# Patient Record
Sex: FEMALE | Race: WHITE | NOT HISPANIC OR LATINO | Employment: OTHER | URBAN - METROPOLITAN AREA
[De-identification: names, ages, dates, MRNs, and addresses within clinical notes are randomized per-mention and may not be internally consistent; named-entity substitution may affect disease eponyms.]

---

## 2023-11-02 PROBLEM — E78.2 MIXED HYPERLIPIDEMIA: Status: ACTIVE | Noted: 2023-11-02

## 2023-11-02 PROBLEM — E04.2 MULTIPLE THYROID NODULES: Status: ACTIVE | Noted: 2023-11-02

## 2023-11-02 PROBLEM — R35.0 URINARY FREQUENCY: Status: ACTIVE | Noted: 2023-11-02

## 2023-11-02 PROBLEM — R31.29 OTHER MICROSCOPIC HEMATURIA: Status: ACTIVE | Noted: 2023-11-02

## 2023-11-02 PROBLEM — D12.6 TUBULAR ADENOMA OF COLON: Status: ACTIVE | Noted: 2023-11-02

## 2023-11-02 PROBLEM — E55.9 VITAMIN D DEFICIENCY: Status: ACTIVE | Noted: 2023-11-02

## 2023-11-02 PROBLEM — E03.8 SUBCLINICAL HYPOTHYROIDISM: Status: ACTIVE | Noted: 2023-11-02

## 2023-11-02 PROBLEM — M85.80 OSTEOPENIA: Status: ACTIVE | Noted: 2023-11-02

## 2023-11-02 RX ORDER — ACETAMINOPHEN 500 MG
1 TABLET ORAL DAILY
COMMUNITY
Start: 2014-08-11

## 2023-11-06 ENCOUNTER — OFFICE VISIT (OUTPATIENT)
Dept: PRIMARY CARE | Facility: CLINIC | Age: 78
End: 2023-11-06
Payer: MEDICARE

## 2023-11-06 VITALS
BODY MASS INDEX: 25.1 KG/M2 | HEART RATE: 71 BPM | HEIGHT: 64 IN | OXYGEN SATURATION: 97 % | DIASTOLIC BLOOD PRESSURE: 74 MMHG | SYSTOLIC BLOOD PRESSURE: 130 MMHG | WEIGHT: 147 LBS

## 2023-11-06 DIAGNOSIS — E03.8 SUBCLINICAL HYPOTHYROIDISM: ICD-10-CM

## 2023-11-06 DIAGNOSIS — M81.0 AGE RELATED OSTEOPOROSIS, UNSPECIFIED PATHOLOGICAL FRACTURE PRESENCE: ICD-10-CM

## 2023-11-06 DIAGNOSIS — R35.0 URINARY FREQUENCY: ICD-10-CM

## 2023-11-06 DIAGNOSIS — Z28.21 IMMUNIZATION DECLINED: ICD-10-CM

## 2023-11-06 DIAGNOSIS — E78.2 MIXED HYPERLIPIDEMIA: ICD-10-CM

## 2023-11-06 DIAGNOSIS — E04.2 MULTIPLE THYROID NODULES: ICD-10-CM

## 2023-11-06 DIAGNOSIS — Z12.31 ENCOUNTER FOR SCREENING MAMMOGRAM FOR BREAST CANCER: ICD-10-CM

## 2023-11-06 DIAGNOSIS — Z00.00 ROUTINE GENERAL MEDICAL EXAMINATION AT HEALTH CARE FACILITY: Primary | ICD-10-CM

## 2023-11-06 PROCEDURE — 1159F MED LIST DOCD IN RCRD: CPT | Performed by: INTERNAL MEDICINE

## 2023-11-06 PROCEDURE — 99214 OFFICE O/P EST MOD 30 MIN: CPT | Performed by: INTERNAL MEDICINE

## 2023-11-06 PROCEDURE — 1036F TOBACCO NON-USER: CPT | Performed by: INTERNAL MEDICINE

## 2023-11-06 PROCEDURE — 1170F FXNL STATUS ASSESSED: CPT | Performed by: INTERNAL MEDICINE

## 2023-11-06 PROCEDURE — 1160F RVW MEDS BY RX/DR IN RCRD: CPT | Performed by: INTERNAL MEDICINE

## 2023-11-06 PROCEDURE — G0439 PPPS, SUBSEQ VISIT: HCPCS | Performed by: INTERNAL MEDICINE

## 2023-11-06 ASSESSMENT — ENCOUNTER SYMPTOMS
PALPITATIONS: 0
ABDOMINAL PAIN: 0
FATIGUE: 0
DIZZINESS: 0
NERVOUS/ANXIOUS: 0
SHORTNESS OF BREATH: 0
DEPRESSED MOOD: 0
TREMORS: 0
MYALGIAS: 0

## 2023-11-06 ASSESSMENT — PATIENT HEALTH QUESTIONNAIRE - PHQ9
2. FEELING DOWN, DEPRESSED OR HOPELESS: NOT AT ALL
1. LITTLE INTEREST OR PLEASURE IN DOING THINGS: NOT AT ALL
SUM OF ALL RESPONSES TO PHQ9 QUESTIONS 1 AND 2: 0

## 2023-11-06 ASSESSMENT — ACTIVITIES OF DAILY LIVING (ADL)
MANAGING_FINANCES: INDEPENDENT
DOING_HOUSEWORK: INDEPENDENT
GROCERY_SHOPPING: INDEPENDENT
BATHING: INDEPENDENT
TAKING_MEDICATION: INDEPENDENT
DRESSING: INDEPENDENT

## 2023-11-06 NOTE — PROGRESS NOTES
"Subjective   Reason for Visit: Carmen Luo is an 77 y.o. female here for a Medicare Wellness visit and chronic medical problems.    Past Medical, Surgical, and Family History reviewed and updated in chart.    Reviewed all medications by prescribing practitioner or clinical pharmacist (such as prescriptions, OTCs, herbal therapies and supplements) and documented in the medical record.    Doing well.  2 meals daily.  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  No change in weight.  Last BD 2021, osteoporosis.  Interested in rheum consult.  Otc and labs reviewed.      Hyperlipidemia  This is a chronic problem. The current episode started more than 1 year ago. The problem is controlled. Recent lipid tests were reviewed and are high. There are no known factors aggravating her hyperlipidemia. Pertinent negatives include no chest pain, myalgias or shortness of breath. She is currently on no antihyperlipidemic treatment. The current treatment provides no improvement of lipids. There are no compliance problems.    Thyroid Problem  Presents for follow-up visit. Patient reports no anxiety, depressed mood, fatigue, palpitations or tremors. The symptoms have been stable. Her past medical history is significant for hyperlipidemia.       Patient Care Team:  Josh Godoy MD as PCP - General  Josh Godoy MD [facility] P[facility]     Review of Systems   Constitutional:  Negative for fatigue.   Respiratory:  Negative for shortness of breath.    Cardiovascular:  Negative for chest pain and palpitations.   Gastrointestinal:  Negative for abdominal pain.   Musculoskeletal:  Negative for myalgias.   Neurological:  Negative for dizziness and tremors.   Psychiatric/Behavioral:  The patient is not nervous/anxious.        Objective   Vitals:  /74 (BP Location: Right arm, Patient Position: Sitting)   Pulse 71   Ht 1.613 m (5' 3.5\")   Wt 66.7 kg (147 lb)   SpO2 97%   BMI 25.63 kg/m²       Physical " Exam  Constitutional:       Appearance: Normal appearance.   Neck:      Vascular: No carotid bruit.   Cardiovascular:      Rate and Rhythm: Normal rate and regular rhythm.      Pulses: Normal pulses.      Heart sounds: Normal heart sounds.   Pulmonary:      Effort: Pulmonary effort is normal.      Breath sounds: Normal breath sounds.   Abdominal:      General: Abdomen is flat. Bowel sounds are normal.      Palpations: Abdomen is soft.   Neurological:      General: No focal deficit present.      Mental Status: She is alert.   Psychiatric:         Mood and Affect: Mood normal.         Behavior: Behavior normal.         Thought Content: Thought content normal.         Judgment: Judgment normal.       Assessment/Plan   Problem List Items Addressed This Visit       Mixed hyperlipidemia    Current Assessment & Plan     Lipids, LDL not at goal without statin.  Recommend low fat/cholesterol diet.  Pt declined statin.           Relevant Orders    CBC and Auto Differential    Comprehensive metabolic panel    Lipid panel    Multiple thyroid nodules    Current Assessment & Plan     US, radha thyroid nodules, 2022.         Relevant Orders    CBC and Auto Differential    Comprehensive metabolic panel    TSH with reflex to Free T4 if abnormal    Subclinical hypothyroidism    Current Assessment & Plan     Thyroid, free T4 and TSH radha without levothyroxine.         Relevant Orders    TSH with reflex to Free T4 if abnormal    Urinary frequency    Relevant Orders    Urinalysis with Reflex Microscopic    Routine general medical examination at health care facility - Primary    Relevant Orders    1 Year Follow Up In Primary Care - Wellness Exam    Immunization declined    Age related osteoporosis    Current Assessment & Plan     Rec rheumatology consult.         Relevant Orders    Referral to Rheumatology    CBC and Auto Differential    Comprehensive metabolic panel    TSH with reflex to Free T4 if abnormal    Vitamin D  25-Hydroxy,Total (for eval of Vitamin D levels)

## 2023-11-06 NOTE — ASSESSMENT & PLAN NOTE
Lipids, LDL not at goal without statin.  Recommend low fat/cholesterol diet.  Pt declined statin.

## 2023-11-09 ENCOUNTER — LAB (OUTPATIENT)
Dept: LAB | Facility: LAB | Age: 78
End: 2023-11-09
Payer: MEDICARE

## 2023-11-09 ENCOUNTER — ANCILLARY PROCEDURE (OUTPATIENT)
Dept: RADIOLOGY | Facility: CLINIC | Age: 78
End: 2023-11-09
Payer: MEDICARE

## 2023-11-09 DIAGNOSIS — M81.0 AGE RELATED OSTEOPOROSIS, UNSPECIFIED PATHOLOGICAL FRACTURE PRESENCE: ICD-10-CM

## 2023-11-09 DIAGNOSIS — R35.0 URINARY FREQUENCY: ICD-10-CM

## 2023-11-09 DIAGNOSIS — E78.2 MIXED HYPERLIPIDEMIA: ICD-10-CM

## 2023-11-09 DIAGNOSIS — Z12.31 ENCOUNTER FOR SCREENING MAMMOGRAM FOR BREAST CANCER: ICD-10-CM

## 2023-11-09 DIAGNOSIS — E03.8 SUBCLINICAL HYPOTHYROIDISM: ICD-10-CM

## 2023-11-09 DIAGNOSIS — E04.2 MULTIPLE THYROID NODULES: ICD-10-CM

## 2023-11-09 PROCEDURE — 84443 ASSAY THYROID STIM HORMONE: CPT

## 2023-11-09 PROCEDURE — 82306 VITAMIN D 25 HYDROXY: CPT

## 2023-11-09 PROCEDURE — 85025 COMPLETE CBC W/AUTO DIFF WBC: CPT

## 2023-11-09 PROCEDURE — 80053 COMPREHEN METABOLIC PANEL: CPT

## 2023-11-09 PROCEDURE — 77063 BREAST TOMOSYNTHESIS BI: CPT | Performed by: STUDENT IN AN ORGANIZED HEALTH CARE EDUCATION/TRAINING PROGRAM

## 2023-11-09 PROCEDURE — 36415 COLL VENOUS BLD VENIPUNCTURE: CPT

## 2023-11-09 PROCEDURE — 80061 LIPID PANEL: CPT

## 2023-11-09 PROCEDURE — 81001 URINALYSIS AUTO W/SCOPE: CPT

## 2023-11-09 PROCEDURE — 77067 SCR MAMMO BI INCL CAD: CPT | Performed by: STUDENT IN AN ORGANIZED HEALTH CARE EDUCATION/TRAINING PROGRAM

## 2023-11-09 PROCEDURE — 77067 SCR MAMMO BI INCL CAD: CPT

## 2023-11-10 DIAGNOSIS — E87.1 HYPONATREMIA: Primary | ICD-10-CM

## 2023-11-10 LAB
25(OH)D3 SERPL-MCNC: 65 NG/ML (ref 30–100)
ALBUMIN SERPL BCP-MCNC: 4.5 G/DL (ref 3.4–5)
ALP SERPL-CCNC: 96 U/L (ref 33–136)
ALT SERPL W P-5'-P-CCNC: 12 U/L (ref 7–45)
ANION GAP SERPL CALC-SCNC: 13 MMOL/L (ref 10–20)
APPEARANCE UR: CLEAR
AST SERPL W P-5'-P-CCNC: 19 U/L (ref 9–39)
BASOPHILS # BLD AUTO: 0.03 X10*3/UL (ref 0–0.1)
BASOPHILS NFR BLD AUTO: 0.4 %
BILIRUB SERPL-MCNC: 0.8 MG/DL (ref 0–1.2)
BILIRUB UR STRIP.AUTO-MCNC: NEGATIVE MG/DL
BUN SERPL-MCNC: 17 MG/DL (ref 6–23)
CALCIUM SERPL-MCNC: 9.8 MG/DL (ref 8.6–10.6)
CHLORIDE SERPL-SCNC: 95 MMOL/L (ref 98–107)
CHOLEST SERPL-MCNC: 236 MG/DL (ref 0–199)
CHOLESTEROL/HDL RATIO: 4.8
CO2 SERPL-SCNC: 25 MMOL/L (ref 21–32)
COLOR UR: COLORLESS
CREAT SERPL-MCNC: 0.76 MG/DL (ref 0.5–1.05)
EOSINOPHIL # BLD AUTO: 0.1 X10*3/UL (ref 0–0.4)
EOSINOPHIL NFR BLD AUTO: 1.3 %
ERYTHROCYTE [DISTWIDTH] IN BLOOD BY AUTOMATED COUNT: 12.6 % (ref 11.5–14.5)
GFR SERPL CREATININE-BSD FRML MDRD: 80 ML/MIN/1.73M*2
GLUCOSE SERPL-MCNC: 94 MG/DL (ref 74–99)
GLUCOSE UR STRIP.AUTO-MCNC: NEGATIVE MG/DL
HCT VFR BLD AUTO: 39.1 % (ref 36–46)
HDLC SERPL-MCNC: 49 MG/DL
HGB BLD-MCNC: 13 G/DL (ref 12–16)
IMM GRANULOCYTES # BLD AUTO: 0.02 X10*3/UL (ref 0–0.5)
IMM GRANULOCYTES NFR BLD AUTO: 0.3 % (ref 0–0.9)
KETONES UR STRIP.AUTO-MCNC: NEGATIVE MG/DL
LDLC SERPL CALC-MCNC: 169 MG/DL
LEUKOCYTE ESTERASE UR QL STRIP.AUTO: NEGATIVE
LYMPHOCYTES # BLD AUTO: 1.84 X10*3/UL (ref 0.8–3)
LYMPHOCYTES NFR BLD AUTO: 23.1 %
MCH RBC QN AUTO: 29.5 PG (ref 26–34)
MCHC RBC AUTO-ENTMCNC: 33.2 G/DL (ref 32–36)
MCV RBC AUTO: 89 FL (ref 80–100)
MONOCYTES # BLD AUTO: 0.49 X10*3/UL (ref 0.05–0.8)
MONOCYTES NFR BLD AUTO: 6.2 %
NEUTROPHILS # BLD AUTO: 5.48 X10*3/UL (ref 1.6–5.5)
NEUTROPHILS NFR BLD AUTO: 68.7 %
NITRITE UR QL STRIP.AUTO: NEGATIVE
NON HDL CHOLESTEROL: 187 MG/DL (ref 0–149)
NRBC BLD-RTO: 0 /100 WBCS (ref 0–0)
PH UR STRIP.AUTO: 7 [PH]
PLATELET # BLD AUTO: 229 X10*3/UL (ref 150–450)
POTASSIUM SERPL-SCNC: 4 MMOL/L (ref 3.5–5.3)
PROT SERPL-MCNC: 6.7 G/DL (ref 6.4–8.2)
PROT UR STRIP.AUTO-MCNC: NEGATIVE MG/DL
RBC # BLD AUTO: 4.41 X10*6/UL (ref 4–5.2)
RBC # UR STRIP.AUTO: ABNORMAL /UL
RBC #/AREA URNS AUTO: NORMAL /HPF
SODIUM SERPL-SCNC: 129 MMOL/L (ref 136–145)
SP GR UR STRIP.AUTO: 1
TRIGL SERPL-MCNC: 92 MG/DL (ref 0–149)
TSH SERPL-ACNC: 3.21 MIU/L (ref 0.44–3.98)
UROBILINOGEN UR STRIP.AUTO-MCNC: <2 MG/DL
VLDL: 18 MG/DL (ref 0–40)
WBC # BLD AUTO: 8 X10*3/UL (ref 4.4–11.3)
WBC #/AREA URNS AUTO: NORMAL /HPF

## 2023-11-20 ENCOUNTER — LAB (OUTPATIENT)
Dept: LAB | Facility: LAB | Age: 78
End: 2023-11-20
Payer: MEDICARE

## 2023-11-20 DIAGNOSIS — E87.1 HYPONATREMIA: ICD-10-CM

## 2023-11-20 LAB
ANION GAP SERPL CALC-SCNC: 10 MMOL/L (ref 10–20)
BUN SERPL-MCNC: 15 MG/DL (ref 6–23)
CALCIUM SERPL-MCNC: 10.1 MG/DL (ref 8.6–10.6)
CHLORIDE SERPL-SCNC: 102 MMOL/L (ref 98–107)
CO2 SERPL-SCNC: 29 MMOL/L (ref 21–32)
CREAT SERPL-MCNC: 0.9 MG/DL (ref 0.5–1.05)
GFR SERPL CREATININE-BSD FRML MDRD: 66 ML/MIN/1.73M*2
GLUCOSE SERPL-MCNC: 106 MG/DL (ref 74–99)
POTASSIUM SERPL-SCNC: 4.1 MMOL/L (ref 3.5–5.3)
SODIUM SERPL-SCNC: 137 MMOL/L (ref 136–145)

## 2023-11-20 PROCEDURE — 80048 BASIC METABOLIC PNL TOTAL CA: CPT

## 2023-11-20 PROCEDURE — 36415 COLL VENOUS BLD VENIPUNCTURE: CPT

## 2024-10-16 ENCOUNTER — TELEPHONE (OUTPATIENT)
Dept: PRIMARY CARE | Facility: CLINIC | Age: 79
End: 2024-10-16
Payer: MEDICARE

## 2024-10-21 DIAGNOSIS — M81.0 AGE-RELATED OSTEOPOROSIS WITHOUT CURRENT PATHOLOGICAL FRACTURE: ICD-10-CM

## 2024-10-21 DIAGNOSIS — E55.9 VITAMIN D DEFICIENCY: ICD-10-CM

## 2024-10-21 DIAGNOSIS — R31.29 OTHER MICROSCOPIC HEMATURIA: ICD-10-CM

## 2024-10-21 DIAGNOSIS — E78.2 MIXED HYPERLIPIDEMIA: Primary | ICD-10-CM

## 2024-10-21 DIAGNOSIS — E03.8 SUBCLINICAL HYPOTHYROIDISM: ICD-10-CM

## 2024-10-25 ENCOUNTER — LAB (OUTPATIENT)
Dept: LAB | Facility: LAB | Age: 79
End: 2024-10-25
Payer: MEDICARE

## 2024-10-25 DIAGNOSIS — E03.8 SUBCLINICAL HYPOTHYROIDISM: ICD-10-CM

## 2024-10-25 DIAGNOSIS — E78.2 MIXED HYPERLIPIDEMIA: ICD-10-CM

## 2024-10-25 DIAGNOSIS — R31.29 OTHER MICROSCOPIC HEMATURIA: ICD-10-CM

## 2024-10-25 DIAGNOSIS — M81.0 AGE-RELATED OSTEOPOROSIS WITHOUT CURRENT PATHOLOGICAL FRACTURE: ICD-10-CM

## 2024-10-25 DIAGNOSIS — E55.9 VITAMIN D DEFICIENCY: ICD-10-CM

## 2024-10-25 LAB
25(OH)D3 SERPL-MCNC: 64 NG/ML (ref 30–100)
ALBUMIN SERPL BCP-MCNC: 4.7 G/DL (ref 3.4–5)
ALP SERPL-CCNC: 107 U/L (ref 33–136)
ALT SERPL W P-5'-P-CCNC: 15 U/L (ref 7–45)
ANION GAP SERPL CALC-SCNC: 11 MMOL/L (ref 10–20)
APPEARANCE UR: CLEAR
AST SERPL W P-5'-P-CCNC: 19 U/L (ref 9–39)
BACTERIA #/AREA URNS AUTO: ABNORMAL /HPF
BASOPHILS # BLD AUTO: 0.04 X10*3/UL (ref 0–0.1)
BASOPHILS NFR BLD AUTO: 0.6 %
BILIRUB SERPL-MCNC: 0.7 MG/DL (ref 0–1.2)
BILIRUB UR STRIP.AUTO-MCNC: NEGATIVE MG/DL
BUN SERPL-MCNC: 16 MG/DL (ref 6–23)
CALCIUM SERPL-MCNC: 10.2 MG/DL (ref 8.6–10.6)
CHLORIDE SERPL-SCNC: 100 MMOL/L (ref 98–107)
CHOLEST SERPL-MCNC: 288 MG/DL (ref 0–199)
CHOLESTEROL/HDL RATIO: 5.4
CO2 SERPL-SCNC: 29 MMOL/L (ref 21–32)
COLOR UR: COLORLESS
CREAT SERPL-MCNC: 0.9 MG/DL (ref 0.5–1.05)
EGFRCR SERPLBLD CKD-EPI 2021: 65 ML/MIN/1.73M*2
EOSINOPHIL # BLD AUTO: 0.14 X10*3/UL (ref 0–0.4)
EOSINOPHIL NFR BLD AUTO: 2.2 %
ERYTHROCYTE [DISTWIDTH] IN BLOOD BY AUTOMATED COUNT: 13 % (ref 11.5–14.5)
GLUCOSE SERPL-MCNC: 99 MG/DL (ref 74–99)
GLUCOSE UR STRIP.AUTO-MCNC: NORMAL MG/DL
HCT VFR BLD AUTO: 40.1 % (ref 36–46)
HDLC SERPL-MCNC: 52.9 MG/DL
HGB BLD-MCNC: 13.5 G/DL (ref 12–16)
IMM GRANULOCYTES # BLD AUTO: 0.01 X10*3/UL (ref 0–0.5)
IMM GRANULOCYTES NFR BLD AUTO: 0.2 % (ref 0–0.9)
KETONES UR STRIP.AUTO-MCNC: NEGATIVE MG/DL
LDLC SERPL CALC-MCNC: 210 MG/DL
LEUKOCYTE ESTERASE UR QL STRIP.AUTO: NEGATIVE
LYMPHOCYTES # BLD AUTO: 1.71 X10*3/UL (ref 0.8–3)
LYMPHOCYTES NFR BLD AUTO: 26.4 %
MCH RBC QN AUTO: 29.2 PG (ref 26–34)
MCHC RBC AUTO-ENTMCNC: 33.7 G/DL (ref 32–36)
MCV RBC AUTO: 87 FL (ref 80–100)
MONOCYTES # BLD AUTO: 0.44 X10*3/UL (ref 0.05–0.8)
MONOCYTES NFR BLD AUTO: 6.8 %
NEUTROPHILS # BLD AUTO: 4.14 X10*3/UL (ref 1.6–5.5)
NEUTROPHILS NFR BLD AUTO: 63.8 %
NITRITE UR QL STRIP.AUTO: NEGATIVE
NON HDL CHOLESTEROL: 235 MG/DL (ref 0–149)
NRBC BLD-RTO: 0 /100 WBCS (ref 0–0)
PH UR STRIP.AUTO: 7 [PH]
PLATELET # BLD AUTO: 224 X10*3/UL (ref 150–450)
POTASSIUM SERPL-SCNC: 4.2 MMOL/L (ref 3.5–5.3)
PROT SERPL-MCNC: 6.8 G/DL (ref 6.4–8.2)
PROT UR STRIP.AUTO-MCNC: NEGATIVE MG/DL
RBC # BLD AUTO: 4.63 X10*6/UL (ref 4–5.2)
RBC # UR STRIP.AUTO: ABNORMAL /UL
RBC #/AREA URNS AUTO: ABNORMAL /HPF
SODIUM SERPL-SCNC: 136 MMOL/L (ref 136–145)
SP GR UR STRIP.AUTO: 1
SQUAMOUS #/AREA URNS AUTO: ABNORMAL /HPF
T4 FREE SERPL-MCNC: 1.13 NG/DL (ref 0.78–1.48)
TRIGL SERPL-MCNC: 124 MG/DL (ref 0–149)
TSH SERPL-ACNC: 4.3 MIU/L (ref 0.44–3.98)
UROBILINOGEN UR STRIP.AUTO-MCNC: NORMAL MG/DL
VLDL: 25 MG/DL (ref 0–40)
WBC # BLD AUTO: 6.5 X10*3/UL (ref 4.4–11.3)
WBC #/AREA URNS AUTO: ABNORMAL /HPF

## 2024-10-25 PROCEDURE — 80053 COMPREHEN METABOLIC PANEL: CPT

## 2024-10-25 PROCEDURE — 81001 URINALYSIS AUTO W/SCOPE: CPT

## 2024-10-25 PROCEDURE — 84439 ASSAY OF FREE THYROXINE: CPT

## 2024-10-25 PROCEDURE — 84443 ASSAY THYROID STIM HORMONE: CPT

## 2024-10-25 PROCEDURE — 82306 VITAMIN D 25 HYDROXY: CPT

## 2024-10-25 PROCEDURE — 80061 LIPID PANEL: CPT

## 2024-10-25 PROCEDURE — 85025 COMPLETE CBC W/AUTO DIFF WBC: CPT

## 2024-11-11 ENCOUNTER — APPOINTMENT (OUTPATIENT)
Dept: PRIMARY CARE | Facility: CLINIC | Age: 79
End: 2024-11-11
Payer: MEDICARE

## 2024-11-11 VITALS
SYSTOLIC BLOOD PRESSURE: 136 MMHG | OXYGEN SATURATION: 97 % | BODY MASS INDEX: 25.61 KG/M2 | DIASTOLIC BLOOD PRESSURE: 78 MMHG | WEIGHT: 150 LBS | HEIGHT: 64 IN | HEART RATE: 71 BPM

## 2024-11-11 DIAGNOSIS — Z12.31 ENCOUNTER FOR SCREENING MAMMOGRAM FOR BREAST CANCER: ICD-10-CM

## 2024-11-11 DIAGNOSIS — Z00.00 ROUTINE GENERAL MEDICAL EXAMINATION AT HEALTH CARE FACILITY: Primary | ICD-10-CM

## 2024-11-11 DIAGNOSIS — M85.80 OSTEOPENIA, UNSPECIFIED LOCATION: ICD-10-CM

## 2024-11-11 DIAGNOSIS — E78.00 HYPERCHOLESTEROLEMIA: ICD-10-CM

## 2024-11-11 DIAGNOSIS — E03.8 SUBCLINICAL HYPOTHYROIDISM: ICD-10-CM

## 2024-11-11 DIAGNOSIS — Z78.0 ASYMPTOMATIC MENOPAUSAL STATE: ICD-10-CM

## 2024-11-11 PROCEDURE — 1159F MED LIST DOCD IN RCRD: CPT | Performed by: INTERNAL MEDICINE

## 2024-11-11 PROCEDURE — 1036F TOBACCO NON-USER: CPT | Performed by: INTERNAL MEDICINE

## 2024-11-11 PROCEDURE — G0439 PPPS, SUBSEQ VISIT: HCPCS | Performed by: INTERNAL MEDICINE

## 2024-11-11 PROCEDURE — 1170F FXNL STATUS ASSESSED: CPT | Performed by: INTERNAL MEDICINE

## 2024-11-11 PROCEDURE — 99214 OFFICE O/P EST MOD 30 MIN: CPT | Performed by: INTERNAL MEDICINE

## 2024-11-11 PROCEDURE — 1160F RVW MEDS BY RX/DR IN RCRD: CPT | Performed by: INTERNAL MEDICINE

## 2024-11-11 RX ORDER — ROSUVASTATIN CALCIUM 5 MG/1
5 TABLET, COATED ORAL DAILY
Qty: 30 TABLET | Refills: 3 | Status: SHIPPED | OUTPATIENT
Start: 2024-11-11

## 2024-11-11 ASSESSMENT — ACTIVITIES OF DAILY LIVING (ADL)
DOING_HOUSEWORK: INDEPENDENT
MANAGING_FINANCES: INDEPENDENT
BATHING: INDEPENDENT
TAKING_MEDICATION: INDEPENDENT
GROCERY_SHOPPING: INDEPENDENT
DRESSING: INDEPENDENT

## 2024-11-11 ASSESSMENT — ENCOUNTER SYMPTOMS
FATIGUE: 0
DIZZINESS: 0
MYALGIAS: 0
SHORTNESS OF BREATH: 0
ABDOMINAL PAIN: 0

## 2024-11-11 ASSESSMENT — PATIENT HEALTH QUESTIONNAIRE - PHQ9
SUM OF ALL RESPONSES TO PHQ9 QUESTIONS 1 AND 2: 0
1. LITTLE INTEREST OR PLEASURE IN DOING THINGS: NOT AT ALL
2. FEELING DOWN, DEPRESSED OR HOPELESS: NOT AT ALL

## 2024-11-11 NOTE — PROGRESS NOTES
"Subjective   Reason for Visit: Carmen Luo is an 78 y.o. female here for a Medicare Wellness visit and chronic medical problems.    Past Medical, Surgical, and Family History reviewed and updated in chart.    Reviewed all medications by prescribing practitioner or clinical pharmacist (such as prescriptions, OTCs, herbal therapies and supplements) and documented in the medical record.    Doing well.  Healthy diet.  Active, house and yard work.  Walking for cardio.  Weight up 3 pounds past year.  No new health issues.  BD 2021 showed osteopenia.  Previously osteoporosis.  Did not fu with rheumatology last year.  No meds, labs reviewed.    Hyperlipidemia  This is a chronic problem. The current episode started more than 1 year ago. The problem is controlled. Recent lipid tests were reviewed and are high. There are no known factors aggravating her hyperlipidemia. Pertinent negatives include no chest pain, myalgias or shortness of breath. She is currently on no antihyperlipidemic treatment. The current treatment provides no improvement of lipids. There are no compliance problems.      Patient Care Team:  Josh Godoy MD as PCP - General  [facility]     Review of Systems   Constitutional:  Negative for fatigue.   Respiratory:  Negative for shortness of breath.    Cardiovascular:  Negative for chest pain.   Gastrointestinal:  Negative for abdominal pain.   Musculoskeletal:  Negative for myalgias.   Neurological:  Negative for dizziness.       Objective   Vitals:  /78 (BP Location: Right arm, Patient Position: Sitting)   Pulse 71   Ht 1.613 m (5' 3.5\")   Wt 68 kg (150 lb)   SpO2 97%   BMI 26.15 kg/m²       Physical Exam  Constitutional:       Appearance: Normal appearance.   Neck:      Vascular: No carotid bruit.   Cardiovascular:      Rate and Rhythm: Normal rate and regular rhythm.      Pulses: Normal pulses.      Heart sounds: Normal heart sounds. "   Pulmonary:      Effort: Pulmonary effort is normal.      Breath sounds: Normal breath sounds.   Neurological:      General: No focal deficit present.      Mental Status: She is alert.   Psychiatric:         Mood and Affect: Mood normal.         Behavior: Behavior normal.         Thought Content: Thought content normal.         Judgment: Judgment normal.         Assessment & Plan  Routine general medical examination at health care facility    Orders:    1 Year Follow Up In Primary Care - Wellness Exam; Future    Encounter for screening mammogram for breast cancer    Orders:    BI mammo bilateral screening tomosynthesis; Future    Asymptomatic menopausal state    Orders:    XR DEXA bone density; Future    Osteopenia, unspecified location  Continue D3.  Rec dexa.         Hypercholesterolemia  Lipids, LDL not at goal without statin.  Recommend low fat/cholesterol diet.  Rec low dose statin.  Check labs in 3-4 months.    Orders:    rosuvastatin (Crestor) 5 mg tablet; Take 1 tablet (5 mg) by mouth once daily.    Lipid Panel; Future    ALT; Future    Subclinical hypothyroidism  Thyroid, free T4 and TSH radha without levothyroxine.

## 2024-11-11 NOTE — ASSESSMENT & PLAN NOTE
Lipids, LDL not at goal without statin.  Recommend low fat/cholesterol diet.  Rec low dose statin.  Check labs in 3-4 months.    Orders:    rosuvastatin (Crestor) 5 mg tablet; Take 1 tablet (5 mg) by mouth once daily.    Lipid Panel; Future    ALT; Future

## 2024-11-18 ENCOUNTER — HOSPITAL ENCOUNTER (OUTPATIENT)
Dept: RADIOLOGY | Facility: CLINIC | Age: 79
Discharge: HOME | End: 2024-11-18
Payer: MEDICARE

## 2024-11-18 DIAGNOSIS — Z78.0 ASYMPTOMATIC MENOPAUSAL STATE: ICD-10-CM

## 2024-11-18 PROCEDURE — 77080 DXA BONE DENSITY AXIAL: CPT

## 2024-11-18 PROCEDURE — 77080 DXA BONE DENSITY AXIAL: CPT | Performed by: STUDENT IN AN ORGANIZED HEALTH CARE EDUCATION/TRAINING PROGRAM

## 2024-11-26 ENCOUNTER — TELEPHONE (OUTPATIENT)
Dept: PRIMARY CARE | Facility: CLINIC | Age: 79
End: 2024-11-26
Payer: MEDICARE

## 2024-11-26 NOTE — TELEPHONE ENCOUNTER
Patient returned call, I read your message regarding bone density report to her, She states she will continue taking D3 and is interested in Rheumatology referral, please advise

## 2024-12-02 ENCOUNTER — TELEPHONE (OUTPATIENT)
Dept: PRIMARY CARE | Facility: CLINIC | Age: 79
End: 2024-12-02
Payer: MEDICARE

## 2024-12-02 DIAGNOSIS — M81.0 AGE-RELATED OSTEOPOROSIS WITHOUT CURRENT PATHOLOGICAL FRACTURE: Primary | ICD-10-CM

## 2024-12-02 NOTE — TELEPHONE ENCOUNTER
----- Message from Shelly SELBY sent at 12/2/2024  3:05 PM EST -----    ----- Message -----  From: Josh Godoy MD  Sent: 12/2/2024   1:02 PM EST  To: Shelly Vera MA    Referral in emr  ----- Message -----  From: Shelly Vera MA  Sent: 11/26/2024   3:26 PM EST  To: MD Carlyn Carter spoke w/pt, advised fabienne.  She would like a referral to rheum

## 2024-12-05 ENCOUNTER — HOSPITAL ENCOUNTER (OUTPATIENT)
Dept: RADIOLOGY | Facility: CLINIC | Age: 79
Discharge: HOME | End: 2024-12-05
Payer: MEDICARE

## 2024-12-05 VITALS — HEIGHT: 64 IN | BODY MASS INDEX: 25.61 KG/M2 | WEIGHT: 150 LBS

## 2024-12-05 DIAGNOSIS — Z12.31 ENCOUNTER FOR SCREENING MAMMOGRAM FOR BREAST CANCER: ICD-10-CM

## 2024-12-05 PROCEDURE — 77067 SCR MAMMO BI INCL CAD: CPT

## 2025-02-11 ENCOUNTER — TELEPHONE (OUTPATIENT)
Dept: PRIMARY CARE | Facility: CLINIC | Age: 80
End: 2025-02-11
Payer: MEDICARE

## 2025-02-11 LAB
ALT SERPL-CCNC: 17 U/L (ref 6–29)
CHOLEST SERPL-MCNC: 317 MG/DL
CHOLEST/HDLC SERPL: 6.1 (CALC)
HDLC SERPL-MCNC: 52 MG/DL
LDLC SERPL CALC-MCNC: 239 MG/DL (CALC)
NONHDLC SERPL-MCNC: 265 MG/DL (CALC)
TRIGL SERPL-MCNC: 118 MG/DL

## 2025-02-11 NOTE — TELEPHONE ENCOUNTER
Pt was in er yesterday with syncope and fall, she would like to know if she needs to come in for fuv office visit, please advise

## 2025-02-24 ENCOUNTER — APPOINTMENT (OUTPATIENT)
Dept: PRIMARY CARE | Facility: CLINIC | Age: 80
End: 2025-02-24
Payer: MEDICARE

## 2025-02-24 VITALS
OXYGEN SATURATION: 98 % | DIASTOLIC BLOOD PRESSURE: 92 MMHG | WEIGHT: 152 LBS | BODY MASS INDEX: 25.95 KG/M2 | SYSTOLIC BLOOD PRESSURE: 180 MMHG | HEART RATE: 74 BPM | HEIGHT: 64 IN

## 2025-02-24 DIAGNOSIS — E78.2 MIXED HYPERLIPIDEMIA: ICD-10-CM

## 2025-02-24 DIAGNOSIS — I10 PRIMARY HYPERTENSION: Primary | ICD-10-CM

## 2025-02-24 PROCEDURE — 1160F RVW MEDS BY RX/DR IN RCRD: CPT | Performed by: INTERNAL MEDICINE

## 2025-02-24 PROCEDURE — 1159F MED LIST DOCD IN RCRD: CPT | Performed by: INTERNAL MEDICINE

## 2025-02-24 PROCEDURE — 3079F DIAST BP 80-89 MM HG: CPT | Performed by: INTERNAL MEDICINE

## 2025-02-24 PROCEDURE — 3077F SYST BP >= 140 MM HG: CPT | Performed by: INTERNAL MEDICINE

## 2025-02-24 PROCEDURE — 1036F TOBACCO NON-USER: CPT | Performed by: INTERNAL MEDICINE

## 2025-02-24 PROCEDURE — 99214 OFFICE O/P EST MOD 30 MIN: CPT | Performed by: INTERNAL MEDICINE

## 2025-02-24 RX ORDER — AMLODIPINE BESYLATE 2.5 MG/1
2.5 TABLET ORAL DAILY
Qty: 30 TABLET | Refills: 3 | Status: SHIPPED | OUTPATIENT
Start: 2025-02-24

## 2025-02-24 ASSESSMENT — ENCOUNTER SYMPTOMS
SHORTNESS OF BREATH: 0
FATIGUE: 0
DIZZINESS: 0
HYPERTENSION: 1
ABDOMINAL PAIN: 0
MYALGIAS: 0

## 2025-02-24 NOTE — PROGRESS NOTES
"Subjective   Patient ID: Carmen Luo is a 79 y.o. female who presents for Follow-up (After ER w/Bp issues) acute/chronic medical problems.    2-10-25, Fell to ground picking up keys at dentist office.  Transported to ED.  /71.  Treated with IVF for dehydration, ? Vasovagal syncope.  BP checks at neighbor's home, 150/90s.  Not following salt in diet.  Not using excessively.  ? FH hypertension father.  Hospital records, meds and labs reviewed.    Hypertension  This is a new problem. The current episode started 1 to 4 weeks ago. The problem is unchanged. The problem is uncontrolled. Pertinent negatives include no chest pain or shortness of breath. Past treatments include nothing. The current treatment provides no improvement.   Hyperlipidemia  This is a chronic problem. The current episode started more than 1 year ago. The problem is uncontrolled. Recent lipid tests were reviewed and are high. Pertinent negatives include no chest pain, myalgias or shortness of breath. She is currently on no antihyperlipidemic treatment. The current treatment provides no improvement of lipids.        Review of Systems   Constitutional:  Negative for fatigue.   Respiratory:  Negative for shortness of breath.    Cardiovascular:  Negative for chest pain.   Gastrointestinal:  Negative for abdominal pain.   Musculoskeletal:  Negative for myalgias.   Neurological:  Negative for dizziness.       Objective   BP (!) 180/92 (BP Location: Left arm, Patient Position: Sitting)   Pulse 74   Ht 1.613 m (5' 3.5\")   Wt 68.9 kg (152 lb)   SpO2 98%   BMI 26.50 kg/m²     Physical Exam  Constitutional:       Appearance: Normal appearance.   Neck:      Vascular: No carotid bruit.   Cardiovascular:      Rate and Rhythm: Normal rate and regular rhythm.      Pulses: Normal pulses.      Heart sounds: Normal heart sounds.   Pulmonary:      Effort: Pulmonary effort is normal.      Breath sounds: Normal breath sounds.   Neurological:      General: No " focal deficit present.      Mental Status: She is alert.   Psychiatric:         Mood and Affect: Mood normal.         Behavior: Behavior normal.         Thought Content: Thought content normal.         Judgment: Judgment normal.         Assessment/Plan   Problem List Items Addressed This Visit             ICD-10-CM    Mixed hyperlipidemia E78.2     Lipids, LDL not at goal without statin.  Recommend low fat/cholesterol diet.  Side effects reported, headaches.         Primary hypertension - Primary I10     BP not at goal without medications.  Rec low salt diet.  Check BP at home, goal < 140/90.  Rec amlodipine 5 mg daily.  Rec daily BP checks with Om[employer] BP machine with Intell[employer]ense.         Relevant Medications    amLODIPine (Norvasc) 2.5 mg tablet

## 2025-02-24 NOTE — ASSESSMENT & PLAN NOTE
BP not at goal without medications.  Rec low salt diet.  Check BP at home, goal < 140/90.  Rec amlodipine 5 mg daily.  Rec daily BP checks with [employer] BP machine with [employer].

## 2025-02-24 NOTE — ASSESSMENT & PLAN NOTE
Lipids, LDL not at goal without statin.  Recommend low fat/cholesterol diet.  Side effects reported, headaches.

## 2025-03-25 ENCOUNTER — APPOINTMENT (OUTPATIENT)
Dept: PRIMARY CARE | Facility: CLINIC | Age: 80
End: 2025-03-25
Payer: MEDICARE

## 2025-03-25 VITALS
HEIGHT: 64 IN | HEART RATE: 74 BPM | OXYGEN SATURATION: 98 % | SYSTOLIC BLOOD PRESSURE: 160 MMHG | WEIGHT: 151.2 LBS | BODY MASS INDEX: 25.81 KG/M2 | DIASTOLIC BLOOD PRESSURE: 86 MMHG

## 2025-03-25 DIAGNOSIS — I10 PRIMARY HYPERTENSION: ICD-10-CM

## 2025-03-25 PROCEDURE — 1159F MED LIST DOCD IN RCRD: CPT | Performed by: INTERNAL MEDICINE

## 2025-03-25 PROCEDURE — 1160F RVW MEDS BY RX/DR IN RCRD: CPT | Performed by: INTERNAL MEDICINE

## 2025-03-25 PROCEDURE — 3079F DIAST BP 80-89 MM HG: CPT | Performed by: INTERNAL MEDICINE

## 2025-03-25 PROCEDURE — 99213 OFFICE O/P EST LOW 20 MIN: CPT | Performed by: INTERNAL MEDICINE

## 2025-03-25 PROCEDURE — 3077F SYST BP >= 140 MM HG: CPT | Performed by: INTERNAL MEDICINE

## 2025-03-25 PROCEDURE — 1036F TOBACCO NON-USER: CPT | Performed by: INTERNAL MEDICINE

## 2025-03-25 RX ORDER — AMLODIPINE BESYLATE 5 MG/1
5 TABLET ORAL DAILY
Qty: 30 TABLET | Refills: 3 | Status: SHIPPED | OUTPATIENT
Start: 2025-03-25

## 2025-03-25 ASSESSMENT — ENCOUNTER SYMPTOMS
SHORTNESS OF BREATH: 0
FATIGUE: 0
ABDOMINAL PAIN: 0
DIZZINESS: 0
HYPERTENSION: 1

## 2025-03-25 NOTE — PROGRESS NOTES
"Subjective   Patient ID: Carmen Luo is a 79 y.o. female who presents for Follow-up (3-4w follow up) BP.    Hypertension  This is a chronic problem. The current episode started more than 1 month ago. The problem has been gradually improving since onset. The problem is uncontrolled. Pertinent negatives include no chest pain or shortness of breath. Past treatments include calcium channel blockers. The current treatment provides moderate improvement. There are no compliance problems.         Review of Systems   Constitutional:  Negative for fatigue.   Respiratory:  Negative for shortness of breath.    Cardiovascular:  Negative for chest pain.   Gastrointestinal:  Negative for abdominal pain.   Neurological:  Negative for dizziness.       Objective   /86 (BP Location: Left arm, Patient Position: Sitting)   Pulse 74   Ht 1.613 m (5' 3.5\")   Wt 68.6 kg (151 lb 3.2 oz)   SpO2 98%   BMI 26.36 kg/m²     Physical Exam  Constitutional:       Appearance: Normal appearance.   Cardiovascular:      Rate and Rhythm: Normal rate and regular rhythm.      Pulses: Normal pulses.      Heart sounds: Normal heart sounds.   Pulmonary:      Effort: Pulmonary effort is normal.      Breath sounds: Normal breath sounds.   Neurological:      General: No focal deficit present.      Mental Status: She is alert.   Psychiatric:         Mood and Affect: Mood normal.         Behavior: Behavior normal.         Thought Content: Thought content normal.         Judgment: Judgment normal.         Assessment/Plan   Problem List Items Addressed This Visit             ICD-10-CM    Primary hypertension I10     BP not at goal with current medications.  Rec low salt diet.  Check BP at home, goal < 140/90.  Increase amlodipine to 5 mg daily.         Relevant Medications    amLODIPine (Norvasc) 5 mg tablet          "

## 2025-03-25 NOTE — ASSESSMENT & PLAN NOTE
BP not at goal with current medications.  Rec low salt diet.  Check BP at home, goal < 140/90.  Increase amlodipine to 5 mg daily.

## 2025-03-27 ENCOUNTER — OFFICE VISIT (OUTPATIENT)
Dept: RHEUMATOLOGY | Facility: CLINIC | Age: 80
End: 2025-03-27
Payer: MEDICARE

## 2025-03-27 VITALS
OXYGEN SATURATION: 95 % | HEART RATE: 72 BPM | DIASTOLIC BLOOD PRESSURE: 73 MMHG | BODY MASS INDEX: 26.75 KG/M2 | WEIGHT: 153.4 LBS | SYSTOLIC BLOOD PRESSURE: 142 MMHG

## 2025-03-27 DIAGNOSIS — M81.0 AGE-RELATED OSTEOPOROSIS WITHOUT CURRENT PATHOLOGICAL FRACTURE: Primary | ICD-10-CM

## 2025-03-27 PROCEDURE — 3077F SYST BP >= 140 MM HG: CPT | Performed by: INTERNAL MEDICINE

## 2025-03-27 PROCEDURE — 3078F DIAST BP <80 MM HG: CPT | Performed by: INTERNAL MEDICINE

## 2025-03-27 PROCEDURE — 1126F AMNT PAIN NOTED NONE PRSNT: CPT | Performed by: INTERNAL MEDICINE

## 2025-03-27 PROCEDURE — 99213 OFFICE O/P EST LOW 20 MIN: CPT | Performed by: INTERNAL MEDICINE

## 2025-03-27 PROCEDURE — G2211 COMPLEX E/M VISIT ADD ON: HCPCS | Performed by: INTERNAL MEDICINE

## 2025-03-27 PROCEDURE — 1159F MED LIST DOCD IN RCRD: CPT | Performed by: INTERNAL MEDICINE

## 2025-03-27 PROCEDURE — 99203 OFFICE O/P NEW LOW 30 MIN: CPT | Performed by: INTERNAL MEDICINE

## 2025-03-27 ASSESSMENT — ENCOUNTER SYMPTOMS
OCCASIONAL FEELINGS OF UNSTEADINESS: 0
LOSS OF SENSATION IN FEET: 0
DEPRESSION: 0

## 2025-03-27 ASSESSMENT — PAIN SCALES - GENERAL: PAINLEVEL_OUTOF10: 0-NO PAIN

## 2025-03-27 ASSESSMENT — PATIENT HEALTH QUESTIONNAIRE - PHQ9
SUM OF ALL RESPONSES TO PHQ9 QUESTIONS 1 AND 2: 0
2. FEELING DOWN, DEPRESSED OR HOPELESS: NOT AT ALL
1. LITTLE INTEREST OR PLEASURE IN DOING THINGS: NOT AT ALL

## 2025-03-27 NOTE — PROGRESS NOTES
Subjective   Patient ID: Carmen Luo is a 79 y.o. female who presents for No chief complaint on file..  HPI  Patient referred for osteoporosis  DEXA 11/18/2024 left total hip -2.0, left femoral neck -2.4, lumbar spine -2.5  DEXA 2/17/2015 left total hip -1.7, lumbar spine -2.6   DEXA 8/3/2012 left total hip -1.5, lumbar spine -2.3,     She does have a history of fracture about 3 years ago when she fell backwards and fractured her left wrist.  She thinks that her mother may have had osteoporosis.  She went through menopause at the age of 45 and she thinks she did do hormone replacement therapy for short time but is uncertain when this was discontinued.  She does live by herself and takes care of her house and walks up and down her driveway to get the mail her paper which is about 500 feet.  She does do calcium and vitamin D supplementation.  She thinks she may have done Fosamax maybe 10 years ago and does not feel like she had any side effects with the medication.  She does not think that she was on the medicine for greater than 10 years.        Review of Systems   HENT:          History of root canal   Gastrointestinal:         Denies any heartburn   Musculoskeletal:  Positive for arthralgias.       Objective   Physical Exam  GEN: NAD A&O x3 appropriate affect    Assessment/Plan        Osteoporosis with history of left wrist fracture.  She thinks she might have been on Fosamax and thinks that it was under 10 years that she had been on it in total.  And old records with her provider at [facility] was listed as an allergy that there was possible jaw pain.  Patient is open to Reclast infusions.  Discussed side effects of medication.  Currently not having any dental issues.  Discussed about weightbearing activity on a regular basis and continue her calcium and vitamin D supplementation.      Hien Schumacher MD 03/27/25 3:01 PM

## 2025-03-30 RX ORDER — HEPARIN SODIUM,PORCINE/PF 10 UNIT/ML
50 SYRINGE (ML) INTRAVENOUS AS NEEDED
OUTPATIENT
Start: 2025-04-14

## 2025-03-30 RX ORDER — ZOLEDRONIC ACID 5 MG/100ML
5 INJECTION, SOLUTION INTRAVENOUS ONCE
OUTPATIENT
Start: 2025-04-14

## 2025-03-30 RX ORDER — HEPARIN 100 UNIT/ML
500 SYRINGE INTRAVENOUS AS NEEDED
OUTPATIENT
Start: 2025-04-14

## 2025-03-30 RX ORDER — DIPHENHYDRAMINE HYDROCHLORIDE 50 MG/ML
50 INJECTION, SOLUTION INTRAMUSCULAR; INTRAVENOUS AS NEEDED
OUTPATIENT
Start: 2025-04-14

## 2025-03-30 RX ORDER — EPINEPHRINE 0.3 MG/.3ML
0.3 INJECTION SUBCUTANEOUS EVERY 5 MIN PRN
OUTPATIENT
Start: 2025-04-14

## 2025-03-30 RX ORDER — ALBUTEROL SULFATE 0.83 MG/ML
3 SOLUTION RESPIRATORY (INHALATION) AS NEEDED
OUTPATIENT
Start: 2025-04-14

## 2025-03-30 RX ORDER — FAMOTIDINE 10 MG/ML
20 INJECTION, SOLUTION INTRAVENOUS ONCE AS NEEDED
OUTPATIENT
Start: 2025-04-14

## 2025-03-30 ASSESSMENT — ENCOUNTER SYMPTOMS
ROS GI COMMENTS: DENIES ANY HEARTBURN
ARTHRALGIAS: 1

## 2025-04-01 DIAGNOSIS — I10 PRIMARY HYPERTENSION: ICD-10-CM

## 2025-04-01 RX ORDER — AMLODIPINE BESYLATE 5 MG/1
5 TABLET ORAL DAILY
Qty: 90 TABLET | Refills: 3 | Status: SHIPPED | OUTPATIENT
Start: 2025-04-01

## 2025-04-09 ENCOUNTER — TELEPHONE (OUTPATIENT)
Dept: HEMATOLOGY/ONCOLOGY | Facility: CLINIC | Age: 80
End: 2025-04-09
Payer: MEDICARE

## 2025-04-09 NOTE — TELEPHONE ENCOUNTER
Called patient to ask her to have labs drawn on 4/10. She was  not aware her provider had ordered any.  Gave her information about our location and what to expect when she is here.   She states no recent dental work, not no infections.   Last dental work was 3 months ago.     She will go to [facility] in [facility] for labs where her pcp is.

## 2025-04-10 ENCOUNTER — LAB (OUTPATIENT)
Dept: LAB | Facility: HOSPITAL | Age: 80
End: 2025-04-10
Payer: MEDICARE

## 2025-04-10 PROCEDURE — 82523 COLLAGEN CROSSLINKS: CPT

## 2025-04-13 LAB
25(OH)D3+25(OH)D2 SERPL-MCNC: 40 NG/ML (ref 30–100)
CALCIUM SERPL-MCNC: 9.9 MG/DL (ref 8.6–10.4)
COLLAGEN CTX SERPL-MCNC: 450 PG/ML
PTH-INTACT SERPL-MCNC: 85 PG/ML (ref 16–77)

## 2025-04-14 ENCOUNTER — INFUSION (OUTPATIENT)
Dept: HEMATOLOGY/ONCOLOGY | Facility: CLINIC | Age: 80
End: 2025-04-14
Payer: MEDICARE

## 2025-04-14 VITALS
OXYGEN SATURATION: 96 % | SYSTOLIC BLOOD PRESSURE: 176 MMHG | RESPIRATION RATE: 16 BRPM | HEIGHT: 62 IN | DIASTOLIC BLOOD PRESSURE: 62 MMHG | TEMPERATURE: 98.4 F | HEART RATE: 65 BPM | BODY MASS INDEX: 28.32 KG/M2 | WEIGHT: 153.88 LBS

## 2025-04-14 DIAGNOSIS — M80.00XA AGE-RELATED OSTEOPOROSIS WITH CURRENT PATHOLOGICAL FRACTURE, INITIAL ENCOUNTER: ICD-10-CM

## 2025-04-14 DIAGNOSIS — M81.0 AGE-RELATED OSTEOPOROSIS WITHOUT CURRENT PATHOLOGICAL FRACTURE: ICD-10-CM

## 2025-04-14 LAB
ANION GAP SERPL CALC-SCNC: 13 MMOL/L (ref 10–20)
BUN SERPL-MCNC: 14 MG/DL (ref 6–23)
CA-I BLD-SCNC: 1.23 MMOL/L (ref 1.1–1.33)
CHLORIDE SERPL-SCNC: 105 MMOL/L (ref 98–107)
CO2 SERPL-SCNC: 24 MMOL/L (ref 21–32)
COLLAGEN NTX SER-SCNC: 22.3 NM BCE
CREAT SERPL-MCNC: 0.8 MG/DL (ref 0.6–1.3)
GFR SERPL CREATININE-BSD FRML MDRD: 75 ML/MIN/1.73M*2
GLUCOSE SERPL-MCNC: 99 MG/DL (ref 74–99)
POTASSIUM SERPL-SCNC: 4.1 MMOL/L (ref 3.5–5.3)
SODIUM SERPL-SCNC: 138 MMOL/L (ref 136–145)

## 2025-04-14 PROCEDURE — 2500000004 HC RX 250 GENERAL PHARMACY W/ HCPCS (ALT 636 FOR OP/ED): Performed by: INTERNAL MEDICINE

## 2025-04-14 PROCEDURE — 80047 BASIC METABLC PNL IONIZED CA: CPT

## 2025-04-14 PROCEDURE — 96365 THER/PROPH/DIAG IV INF INIT: CPT | Mod: INF

## 2025-04-14 RX ORDER — FAMOTIDINE 10 MG/ML
20 INJECTION, SOLUTION INTRAVENOUS ONCE AS NEEDED
OUTPATIENT
Start: 2025-04-14

## 2025-04-14 RX ORDER — HEPARIN SODIUM,PORCINE/PF 10 UNIT/ML
50 SYRINGE (ML) INTRAVENOUS AS NEEDED
OUTPATIENT
Start: 2025-04-14

## 2025-04-14 RX ORDER — ZOLEDRONIC ACID 5 MG/100ML
5 INJECTION, SOLUTION INTRAVENOUS ONCE
Status: CANCELLED | OUTPATIENT
Start: 2025-04-14

## 2025-04-14 RX ORDER — EPINEPHRINE 0.3 MG/.3ML
0.3 INJECTION SUBCUTANEOUS EVERY 5 MIN PRN
OUTPATIENT
Start: 2025-04-14

## 2025-04-14 RX ORDER — HEPARIN 100 UNIT/ML
500 SYRINGE INTRAVENOUS AS NEEDED
OUTPATIENT
Start: 2025-04-14

## 2025-04-14 RX ORDER — ZOLEDRONIC ACID 5 MG/100ML
5 INJECTION, SOLUTION INTRAVENOUS ONCE
Status: COMPLETED | OUTPATIENT
Start: 2025-04-14 | End: 2025-04-14

## 2025-04-14 RX ORDER — ALBUTEROL SULFATE 0.83 MG/ML
3 SOLUTION RESPIRATORY (INHALATION) AS NEEDED
OUTPATIENT
Start: 2025-04-14

## 2025-04-14 RX ORDER — DIPHENHYDRAMINE HYDROCHLORIDE 50 MG/ML
50 INJECTION, SOLUTION INTRAMUSCULAR; INTRAVENOUS AS NEEDED
OUTPATIENT
Start: 2025-04-14

## 2025-04-14 RX ADMIN — ZOLEDRONIC ACID 5 MG: 5 INJECTION, SOLUTION INTRAVENOUS at 09:51

## 2025-04-14 ASSESSMENT — PAIN SCALES - GENERAL: PAINLEVEL_OUTOF10: 0-NO PAIN

## 2025-04-14 NOTE — PROGRESS NOTES
[facility] Infusion Note     Carmen Luo is a 79 y.o. year old female here today,04/14/25,  in the [facility] infusion center for the following treatment:        Medications given:  Administrations This Visit       zoledronic acid (Reclast) 5 mg  mL       Admin Date  04/14/2025 Action  New Bag Dose  5 mg Route  intravenous Documented By  Shantell De Dios, RN                    Pt tolerated infusion well and was discharged to home in stable condition. Pt ambulated  off the unit independently with a slow and steady gait. Pt had no further questions or concerns at this time.

## 2025-04-22 ENCOUNTER — APPOINTMENT (OUTPATIENT)
Dept: PRIMARY CARE | Facility: CLINIC | Age: 80
End: 2025-04-22
Payer: MEDICARE

## 2025-04-22 VITALS
OXYGEN SATURATION: 96 % | HEART RATE: 71 BPM | BODY MASS INDEX: 27.97 KG/M2 | DIASTOLIC BLOOD PRESSURE: 82 MMHG | WEIGHT: 152 LBS | SYSTOLIC BLOOD PRESSURE: 138 MMHG | HEIGHT: 62 IN

## 2025-04-22 DIAGNOSIS — E78.2 MIXED HYPERLIPIDEMIA: ICD-10-CM

## 2025-04-22 DIAGNOSIS — I10 PRIMARY HYPERTENSION: Primary | ICD-10-CM

## 2025-04-22 PROCEDURE — 3078F DIAST BP <80 MM HG: CPT | Performed by: INTERNAL MEDICINE

## 2025-04-22 PROCEDURE — 3075F SYST BP GE 130 - 139MM HG: CPT | Performed by: INTERNAL MEDICINE

## 2025-04-22 PROCEDURE — 1160F RVW MEDS BY RX/DR IN RCRD: CPT | Performed by: INTERNAL MEDICINE

## 2025-04-22 PROCEDURE — 1124F ACP DISCUSS-NO DSCNMKR DOCD: CPT | Performed by: INTERNAL MEDICINE

## 2025-04-22 PROCEDURE — 99214 OFFICE O/P EST MOD 30 MIN: CPT | Performed by: INTERNAL MEDICINE

## 2025-04-22 PROCEDURE — 1159F MED LIST DOCD IN RCRD: CPT | Performed by: INTERNAL MEDICINE

## 2025-04-22 PROCEDURE — 1036F TOBACCO NON-USER: CPT | Performed by: INTERNAL MEDICINE

## 2025-04-22 ASSESSMENT — ENCOUNTER SYMPTOMS
HYPERTENSION: 1
DIZZINESS: 0
SHORTNESS OF BREATH: 0
FATIGUE: 0
MYALGIAS: 0
ABDOMINAL PAIN: 0

## 2025-04-22 NOTE — ASSESSMENT & PLAN NOTE
Lipids, LDL not at goal without statin.  Recommend low fat/cholesterol diet.  Recheck labs next month, now on statin.

## 2025-04-22 NOTE — PROGRESS NOTES
"Subjective   Patient ID: Carmen Luo is a 79 y.o. female who presents for Follow-up BP and lipids.    Hypertension  This is a chronic problem. The current episode started more than 1 year ago. The problem has been resolved since onset. The problem is controlled. Pertinent negatives include no chest pain or shortness of breath. The current treatment provides significant improvement. There are no compliance problems.    Hyperlipidemia  This is a chronic problem. The current episode started more than 1 year ago. The problem is controlled. Recent lipid tests were reviewed and are high. Pertinent negatives include no chest pain, myalgias or shortness of breath. Current antihyperlipidemic treatment includes statins. There are no compliance problems.         Review of Systems   Constitutional:  Negative for fatigue.   Respiratory:  Negative for shortness of breath.    Cardiovascular:  Negative for chest pain.   Gastrointestinal:  Negative for abdominal pain.   Musculoskeletal:  Negative for myalgias.   Neurological:  Negative for dizziness.       Objective   /82   Pulse 71   Ht 1.575 m (5' 2\")   Wt 68.9 kg (152 lb)   SpO2 96%   BMI 27.80 kg/m²     Physical Exam  Constitutional:       Appearance: Normal appearance.   Cardiovascular:      Rate and Rhythm: Normal rate and regular rhythm.      Pulses: Normal pulses.      Heart sounds: Normal heart sounds.   Pulmonary:      Effort: Pulmonary effort is normal.      Breath sounds: Normal breath sounds.   Neurological:      General: No focal deficit present.      Mental Status: She is alert.   Psychiatric:         Mood and Affect: Mood normal.         Behavior: Behavior normal.         Thought Content: Thought content normal.         Judgment: Judgment normal.         Assessment/Plan   Problem List Items Addressed This Visit           ICD-10-CM    Mixed hyperlipidemia E78.2    Lipids, LDL not at goal without statin.  Recommend low fat/cholesterol diet.  Recheck labs " next month, now on statin.           Relevant Orders    Lipid panel    Primary hypertension - Primary I10    BP at goal with current medications.  Rec low salt diet.  Check BP at home, goal < 140/90.

## 2025-06-17 LAB
CHOLEST SERPL-MCNC: 278 MG/DL
CHOLEST/HDLC SERPL: 5.3 (CALC)
HDLC SERPL-MCNC: 52 MG/DL
LDLC SERPL CALC-MCNC: 185 MG/DL (CALC)
NONHDLC SERPL-MCNC: 226 MG/DL (CALC)
TRIGL SERPL-MCNC: 217 MG/DL

## 2025-06-23 ENCOUNTER — APPOINTMENT (OUTPATIENT)
Dept: PRIMARY CARE | Facility: CLINIC | Age: 80
End: 2025-06-23
Payer: MEDICARE

## 2025-06-23 VITALS
HEART RATE: 58 BPM | HEIGHT: 62 IN | DIASTOLIC BLOOD PRESSURE: 76 MMHG | OXYGEN SATURATION: 96 % | BODY MASS INDEX: 27.79 KG/M2 | WEIGHT: 151 LBS | SYSTOLIC BLOOD PRESSURE: 134 MMHG

## 2025-06-23 DIAGNOSIS — I10 PRIMARY HYPERTENSION: ICD-10-CM

## 2025-06-23 DIAGNOSIS — E78.2 MIXED HYPERLIPIDEMIA: Primary | ICD-10-CM

## 2025-06-23 PROCEDURE — 1036F TOBACCO NON-USER: CPT | Performed by: INTERNAL MEDICINE

## 2025-06-23 PROCEDURE — 3078F DIAST BP <80 MM HG: CPT | Performed by: INTERNAL MEDICINE

## 2025-06-23 PROCEDURE — 1159F MED LIST DOCD IN RCRD: CPT | Performed by: INTERNAL MEDICINE

## 2025-06-23 PROCEDURE — 99214 OFFICE O/P EST MOD 30 MIN: CPT | Performed by: INTERNAL MEDICINE

## 2025-06-23 PROCEDURE — 1160F RVW MEDS BY RX/DR IN RCRD: CPT | Performed by: INTERNAL MEDICINE

## 2025-06-23 PROCEDURE — 3075F SYST BP GE 130 - 139MM HG: CPT | Performed by: INTERNAL MEDICINE

## 2025-06-23 RX ORDER — ACETAMINOPHEN 160 MG/5ML
200 SUSPENSION, ORAL (FINAL DOSE FORM) ORAL DAILY
COMMUNITY

## 2025-06-23 RX ORDER — ROSUVASTATIN CALCIUM 10 MG/1
10 TABLET, COATED ORAL DAILY
Qty: 90 TABLET | Refills: 3 | Status: SHIPPED | OUTPATIENT
Start: 2025-06-23

## 2025-06-23 ASSESSMENT — ENCOUNTER SYMPTOMS
HYPERTENSION: 1
SHORTNESS OF BREATH: 0
ABDOMINAL PAIN: 0
FATIGUE: 0
MYALGIAS: 0
DIZZINESS: 0

## 2025-06-23 NOTE — ASSESSMENT & PLAN NOTE
Lipids, LDL not at goal with statin.  Ran out of meds one month ago.  Recommend low fat/cholesterol diet.  Increase rosuvastatin to 10 mg daily.  Add CoQ10 200 mg daily.

## 2025-06-23 NOTE — PROGRESS NOTES
"Subjective   Patient ID: Carmen Luo is a 79 y.o. female who presents for Follow-up chronic medical problems.    Hyperlipidemia  This is a chronic problem. The current episode started more than 1 year ago. The problem is uncontrolled. Recent lipid tests were reviewed and are high. Pertinent negatives include no chest pain, myalgias or shortness of breath. Current antihyperlipidemic treatment includes statins. The current treatment provides mild improvement of lipids. There are no compliance problems.    Hypertension  This is a chronic problem. The current episode started more than 1 year ago. The problem has been resolved since onset. The problem is controlled. Pertinent negatives include no chest pain or shortness of breath. Risk factors for coronary artery disease include family history. The current treatment provides significant improvement. There are no compliance problems.         Review of Systems   Constitutional:  Negative for fatigue.   Respiratory:  Negative for shortness of breath.    Cardiovascular:  Negative for chest pain.   Gastrointestinal:  Negative for abdominal pain.   Musculoskeletal:  Negative for myalgias.   Neurological:  Negative for dizziness.       Objective   /76 (BP Location: Right arm, Patient Position: Sitting)   Pulse 58   Ht 1.575 m (5' 2\")   Wt 68.5 kg (151 lb)   SpO2 96%   BMI 27.62 kg/m²     Physical Exam  Constitutional:       Appearance: Normal appearance.   Neck:      Vascular: No carotid bruit.   Cardiovascular:      Rate and Rhythm: Normal rate and regular rhythm.      Pulses: Normal pulses.      Heart sounds: Normal heart sounds.   Pulmonary:      Effort: Pulmonary effort is normal.      Breath sounds: Normal breath sounds.   Neurological:      General: No focal deficit present.      Mental Status: She is alert.   Psychiatric:         Mood and Affect: Mood normal.         Behavior: Behavior normal.         Thought Content: Thought content normal.         " Judgment: Judgment normal.         Assessment/Plan   Problem List Items Addressed This Visit           ICD-10-CM    Mixed hyperlipidemia - Primary E78.2    Lipids, LDL not at goal with statin.  Ran out of meds one month ago.  Recommend low fat/cholesterol diet.  Increase rosuvastatin to 10 mg daily.  Add CoQ10 200 mg daily.         Relevant Medications    rosuvastatin (Crestor) 10 mg tablet    Other Relevant Orders    CBC and Auto Differential    Comprehensive metabolic panel    Lipid Panel    TSH with reflex to Free T4 if abnormal    Primary hypertension I10    BP at goal with current medications.  Rec low salt diet.  Check BP at home, goal < 140/90.           Relevant Orders    CBC and Auto Differential    Comprehensive metabolic panel    Urinalysis with Reflex Microscopic

## 2025-11-10 ENCOUNTER — APPOINTMENT (OUTPATIENT)
Dept: PRIMARY CARE | Facility: CLINIC | Age: 80
End: 2025-11-10
Payer: MEDICARE

## 2025-12-22 ENCOUNTER — APPOINTMENT (OUTPATIENT)
Dept: PRIMARY CARE | Facility: CLINIC | Age: 80
End: 2025-12-22
Payer: MEDICARE